# Patient Record
Sex: FEMALE | Race: OTHER
[De-identification: names, ages, dates, MRNs, and addresses within clinical notes are randomized per-mention and may not be internally consistent; named-entity substitution may affect disease eponyms.]

---

## 2023-08-16 ENCOUNTER — NON-APPOINTMENT (OUTPATIENT)
Age: 68
End: 2023-08-16

## 2024-01-19 ENCOUNTER — OFFICE (OUTPATIENT)
Facility: LOCATION | Age: 69
Setting detail: OPHTHALMOLOGY
End: 2024-01-19
Payer: MEDICARE

## 2024-01-19 ENCOUNTER — RX ONLY (RX ONLY)
Age: 69
End: 2024-01-19

## 2024-01-19 DIAGNOSIS — H01.004: ICD-10-CM

## 2024-01-19 DIAGNOSIS — H01.005: ICD-10-CM

## 2024-01-19 DIAGNOSIS — H01.002: ICD-10-CM

## 2024-01-19 DIAGNOSIS — H01.001: ICD-10-CM

## 2024-01-19 PROCEDURE — 92012 INTRM OPH EXAM EST PATIENT: CPT | Performed by: OPHTHALMOLOGY

## 2024-01-19 ASSESSMENT — LID EXAM ASSESSMENTS
OD_BLEPHARITIS: RLL RUL 3+
OS_BLEPHARITIS: LLL LUL 3+
OD_EDEMA: RUL 1+

## 2024-02-27 ENCOUNTER — OFFICE (OUTPATIENT)
Facility: LOCATION | Age: 69
Setting detail: OPHTHALMOLOGY
End: 2024-02-27
Payer: MEDICARE

## 2024-02-27 DIAGNOSIS — H01.004: ICD-10-CM

## 2024-02-27 DIAGNOSIS — H52.4: ICD-10-CM

## 2024-02-27 DIAGNOSIS — H25.13: ICD-10-CM

## 2024-02-27 DIAGNOSIS — H43.813: ICD-10-CM

## 2024-02-27 DIAGNOSIS — H16.223: ICD-10-CM

## 2024-02-27 DIAGNOSIS — H01.005: ICD-10-CM

## 2024-02-27 DIAGNOSIS — H01.002: ICD-10-CM

## 2024-02-27 DIAGNOSIS — H01.001: ICD-10-CM

## 2024-02-27 PROCEDURE — 92014 COMPRE OPH EXAM EST PT 1/>: CPT | Performed by: OPHTHALMOLOGY

## 2024-02-27 PROCEDURE — 92015 DETERMINE REFRACTIVE STATE: CPT | Performed by: OPHTHALMOLOGY

## 2024-02-27 PROCEDURE — 92250 FUNDUS PHOTOGRAPHY W/I&R: CPT | Performed by: OPHTHALMOLOGY

## 2024-02-27 PROCEDURE — 92202 OPSCPY EXTND ON/MAC DRAW: CPT | Performed by: OPHTHALMOLOGY

## 2024-02-27 ASSESSMENT — SUPERFICIAL PUNCTATE KERATITIS (SPK)
OS_SPK: 3+
OD_SPK: 3+

## 2024-02-27 ASSESSMENT — REFRACTION_AUTOREFRACTION
OD_SPHERE: +1.75
OS_SPHERE: +3.00
OS_AXIS: 075
OS_CYLINDER: -0.25
OD_CYLINDER: -0.25
OD_AXIS: 040

## 2024-02-27 ASSESSMENT — REFRACTION_CURRENTRX
OD_AXIS: 060
OS_SPHERE: +4.50
OS_AXIS: 090
OS_CYLINDER: -1.50
OS_OVR_VA: 20/
OS_SPHERE: +3.25
OS_OVR_VA: 20/
OD_OVR_VA: 20/
OD_CYLINDER: -0.50
OD_CYLINDER: -0.50
OS_AXIS: 090
OD_SPHERE: +1.25
OD_OVR_VA: 20/
OD_SPHERE: +2.50
OD_AXIS: 055
OS_CYLINDER: -0.50

## 2024-02-27 ASSESSMENT — SPHEQUIV_DERIVED
OD_SPHEQUIV: 1
OS_SPHEQUIV: 2.5
OD_SPHEQUIV: 1.625
OS_SPHEQUIV: 2.875

## 2024-02-27 ASSESSMENT — REFRACTION_MANIFEST
OD_AXIS: 060
OS_SPHERE: +3.25
OD_SPHERE: +1.25
OS_AXIS: 090
OD_ADD: +2.25
OS_CYLINDER: -1.50
OD_CYLINDER: -0.50
OS_ADD: +2.25

## 2024-02-27 ASSESSMENT — CONFRONTATIONAL VISUAL FIELD TEST (CVF)
OD_FINDINGS: FULL
OS_FINDINGS: FULL

## 2024-02-27 ASSESSMENT — LID EXAM ASSESSMENTS
OS_BLEPHARITIS: LLL LUL 3+
OD_EDEMA: RUL 1+
OD_BLEPHARITIS: RLL RUL 3+

## 2024-07-16 PROBLEM — Z00.00 ENCOUNTER FOR PREVENTIVE HEALTH EXAMINATION: Status: ACTIVE | Noted: 2024-07-16

## 2024-07-18 ENCOUNTER — APPOINTMENT (OUTPATIENT)
Dept: ORTHOPEDIC SURGERY | Facility: CLINIC | Age: 69
End: 2024-07-18
Payer: MEDICARE

## 2024-07-18 VITALS — HEIGHT: 65 IN | WEIGHT: 130 LBS | BODY MASS INDEX: 21.66 KG/M2

## 2024-07-18 DIAGNOSIS — M17.11 UNILATERAL PRIMARY OSTEOARTHRITIS, RIGHT KNEE: ICD-10-CM

## 2024-07-18 PROCEDURE — 99203 OFFICE O/P NEW LOW 30 MIN: CPT

## 2024-07-19 DIAGNOSIS — M25.561 PAIN IN RIGHT KNEE: ICD-10-CM

## 2024-07-19 RX ORDER — MELOXICAM 15 MG/1
15 TABLET ORAL
Qty: 14 | Refills: 1 | Status: ACTIVE | COMMUNITY
Start: 2024-07-19 | End: 1900-01-01

## 2024-07-22 PROBLEM — M17.11 PRIMARY OSTEOARTHRITIS OF RIGHT KNEE: Status: ACTIVE | Noted: 2024-07-22

## 2024-09-16 ENCOUNTER — APPOINTMENT (OUTPATIENT)
Dept: ORTHOPEDIC SURGERY | Facility: CLINIC | Age: 69
End: 2024-09-16
Payer: MEDICARE

## 2024-09-16 VITALS
SYSTOLIC BLOOD PRESSURE: 135 MMHG | HEIGHT: 65 IN | HEART RATE: 76 BPM | WEIGHT: 130 LBS | DIASTOLIC BLOOD PRESSURE: 75 MMHG | BODY MASS INDEX: 21.66 KG/M2

## 2024-09-16 PROCEDURE — 99214 OFFICE O/P EST MOD 30 MIN: CPT | Mod: 25

## 2024-09-16 PROCEDURE — 20611 DRAIN/INJ JOINT/BURSA W/US: CPT | Mod: RT

## 2024-09-16 RX ORDER — HYALURONATE SODIUM 20 MG/2 ML
20 SYRINGE (ML) INTRAARTICULAR
Refills: 0 | Status: COMPLETED | OUTPATIENT
Start: 2024-09-16

## 2024-09-16 RX ADMIN — Medication 0 MG/2ML: at 00:00

## 2024-09-17 NOTE — REASON FOR VISIT
[Follow-Up Visit] : a follow-up visit for [Knee Pain] : knee pain [FreeTextEntry2] : Patient presents for follow up of primary osteoarthritis of the right knee.

## 2024-09-17 NOTE — DISCUSSION/SUMMARY
[de-identified] : The patient presents today for follow-up regarding right knee osteoarthritis.  She is noting continued pain despite meloxicam and modification of activities.  She continues to have to ambulate with a cane secondary to the pain.  I discussed further treatment options.  Patient understands that she needs a knee replacement to improve her function however she is not ready to proceed as she has some family functions that she needs to attend over the next few months.  In lieu of that we recommended a course of Euflexxa to the patient.  On today's visit we injected the first dose of Euflexxa under sterile conditions without difficulty.  Instructions are given postinjection for ice analgesics and modification of activities.  I will see the patient back in the office in 1 week for the next injection.  At least 30 minutes was spent performing the evaluation and management on today's office visit.  This includes but is not limited to preparing to see patient including review of any test results or outside medical records, obtaining and/or reviewing separately obtained history, performing examination and evaluation, counseling and educating the patient on their diagnosis and treatment recommendations, ordering medications, tests, or procedures, documenting clinical information in the electronic health record, independently interpreting results (not separately reported) and communicating results to the patient, and coordination of care.

## 2024-09-17 NOTE — PHYSICAL EXAM
[de-identified] : The patient appears well nourished  and in no apparent distress.  The patient is alert and oriented to person, place, and time.   Affect and mood appear normal.    The head is normocephalic and atraumatic.  The eyes reveal normal sclera and extra ocular muscles are intact.   The neck appears normal with no jugular venous distention or masses noted.   Skin shows normal turgor with no evidence of eczema or psoriasis.  No respiratory distress noted.  The patient ambulates with an antalgic gait.  The right knee has improvement of range of motion from 0 to 120 degrees.  Pain noted with terminal flexion.  Tenderness noted over the medial joint.  Mild effusion noted.  No ecchymosis noted.  There is a negative Lachman sign and negative anterior/posterior drawer.  Negative pivot shift test.  There is no instability to varus/valgus stress.    Strength could not be tested secondary to discomfort and loss of motion.  Sensation intact distally.  There are normal pulses distally and good capillary refill.  No edema or lymphadenopathy noted.

## 2024-09-17 NOTE — DISCUSSION/SUMMARY
[de-identified] : The patient presents today for follow-up regarding right knee osteoarthritis.  She is noting continued pain despite meloxicam and modification of activities.  She continues to have to ambulate with a cane secondary to the pain.  I discussed further treatment options.  Patient understands that she needs a knee replacement to improve her function however she is not ready to proceed as she has some family functions that she needs to attend over the next few months.  In lieu of that we recommended a course of Euflexxa to the patient.  On today's visit we injected the first dose of Euflexxa under sterile conditions without difficulty.  Instructions are given postinjection for ice analgesics and modification of activities.  I will see the patient back in the office in 1 week for the next injection.  At least 30 minutes was spent performing the evaluation and management on today's office visit.  This includes but is not limited to preparing to see patient including review of any test results or outside medical records, obtaining and/or reviewing separately obtained history, performing examination and evaluation, counseling and educating the patient on their diagnosis and treatment recommendations, ordering medications, tests, or procedures, documenting clinical information in the electronic health record, independently interpreting results (not separately reported) and communicating results to the patient, and coordination of care.

## 2024-09-17 NOTE — PHYSICAL EXAM
[de-identified] : The patient appears well nourished  and in no apparent distress.  The patient is alert and oriented to person, place, and time.   Affect and mood appear normal.    The head is normocephalic and atraumatic.  The eyes reveal normal sclera and extra ocular muscles are intact.   The neck appears normal with no jugular venous distention or masses noted.   Skin shows normal turgor with no evidence of eczema or psoriasis.  No respiratory distress noted.  The patient ambulates with an antalgic gait.  The right knee has improvement of range of motion from 0 to 120 degrees.  Pain noted with terminal flexion.  Tenderness noted over the medial joint.  Mild effusion noted.  No ecchymosis noted.  There is a negative Lachman sign and negative anterior/posterior drawer.  Negative pivot shift test.  There is no instability to varus/valgus stress.    Strength could not be tested secondary to discomfort and loss of motion.  Sensation intact distally.  There are normal pulses distally and good capillary refill.  No edema or lymphadenopathy noted.

## 2024-09-17 NOTE — HISTORY OF PRESENT ILLNESS
[de-identified] : This patient presents today for follow-up regarding osteoarthritis about the right knee.  She states her symptoms have somewhat improved but still has a lot of pain and has to ambulate with a cane which she is not happy about.  Pain level 3 out of 10.  Pain worse with activity such as ambulation and stair climbing.  Pain is improved with rest.  Denies radicular symptoms or numbness and tingling.  She did take the meloxicam which was prescribed with some improvement but still has significant discomfort with activities.  Patient takes Advil intermittently for the pain.  She presents today for follow-up and reevaluation regarding continued pain secondary to osteoarthritis.

## 2024-09-17 NOTE — PROCEDURE
[de-identified] : Using sterile technique, 2cc of depomedrol 40mg/ml and 3cc of 1% plain lidocaine was drawn up into a sterile 5cc syringe.  The right knee was then sterilely prepped with chlorhexidine and ethylene chloride spray was used as an anesthetic prior to injection.  Under ultrasound guidance utilizing the Timmons Lumify ultrasound probe the depomedrol/lidocaine mixture was injected into the knee joint just above and lateral to the patella into the suprapatellar pouch.  The injection was confirmed using ultrasound and a spot image was saved of the injection.  The patient tolerated the procedure well without difficulty.  The patient was given instructions on the use of ice and anti-inflammatories post injection site soreness. English

## 2024-09-17 NOTE — PROCEDURE
[de-identified] : Using sterile technique, 2cc of depomedrol 40mg/ml and 3cc of 1% plain lidocaine was drawn up into a sterile 5cc syringe.  The right knee was then sterilely prepped with chlorhexidine and ethylene chloride spray was used as an anesthetic prior to injection.  Under ultrasound guidance utilizing the Timmons Lumify ultrasound probe the depomedrol/lidocaine mixture was injected into the knee joint just above and lateral to the patella into the suprapatellar pouch.  The injection was confirmed using ultrasound and a spot image was saved of the injection.  The patient tolerated the procedure well without difficulty.  The patient was given instructions on the use of ice and anti-inflammatories post injection site soreness.

## 2024-09-17 NOTE — HISTORY OF PRESENT ILLNESS
[de-identified] : This patient presents today for follow-up regarding osteoarthritis about the right knee.  She states her symptoms have somewhat improved but still has a lot of pain and has to ambulate with a cane which she is not happy about.  Pain level 3 out of 10.  Pain worse with activity such as ambulation and stair climbing.  Pain is improved with rest.  Denies radicular symptoms or numbness and tingling.  She did take the meloxicam which was prescribed with some improvement but still has significant discomfort with activities.  Patient takes Advil intermittently for the pain.  She presents today for follow-up and reevaluation regarding continued pain secondary to osteoarthritis.

## 2024-09-24 ENCOUNTER — APPOINTMENT (OUTPATIENT)
Dept: ORTHOPEDIC SURGERY | Facility: CLINIC | Age: 69
End: 2024-09-24
Payer: MEDICARE

## 2024-09-24 DIAGNOSIS — M17.11 UNILATERAL PRIMARY OSTEOARTHRITIS, RIGHT KNEE: ICD-10-CM

## 2024-09-24 PROCEDURE — 20611 DRAIN/INJ JOINT/BURSA W/US: CPT | Mod: RT

## 2024-09-24 RX ORDER — HYALURONATE SODIUM 20 MG/2 ML
20 SYRINGE (ML) INTRAARTICULAR
Refills: 0 | Status: COMPLETED | OUTPATIENT
Start: 2024-09-24

## 2024-09-24 RX ADMIN — Medication 2 MG/2ML: at 00:00

## 2024-09-24 NOTE — PHYSICAL EXAM
[de-identified] : The patient appears well nourished  and in no apparent distress.  The patient is alert and oriented to person, place, and time.   Affect and mood appear normal.    The head is normocephalic and atraumatic.  The eyes reveal normal sclera and extra ocular muscles are intact.   The neck appears normal with no jugular venous distention or masses noted.   Skin shows normal turgor with no evidence of eczema or psoriasis.  No respiratory distress noted.  The patient ambulates with an antalgic gait.  The right knee has improvement of range of motion from 0 to 120 degrees.  Pain noted with terminal flexion.  Tenderness noted over the medial joint.  Mild effusion noted.  No ecchymosis noted.  There is a negative Lachman sign and negative anterior/posterior drawer.  Negative pivot shift test.  There is no instability to varus/valgus stress.    Strength could not be tested secondary to discomfort and loss of motion.  Sensation intact distally.  There are normal pulses distally and good capillary refill.  No edema or lymphadenopathy noted.

## 2024-09-24 NOTE — DISCUSSION/SUMMARY
[de-identified] : On todays visit we injected the  dose of hyaluronic acid without difficulty.  Instructions were given postinjection regarding ice, analgesics, and modification of activities.  I will see the patient back in one week for the next injection.

## 2024-09-24 NOTE — PROCEDURE
[de-identified] : Using sterile technique, 2cc of depomedrol 40mg/ml and 3cc of 1% plain lidocaine was drawn up into a sterile 5cc syringe.  The right knee was then sterilely prepped with chlorhexidine and ethylene chloride spray was used as an anesthetic prior to injection.  Under ultrasound guidance utilizing the Timmons Lumify ultrasound probe the depomedrol/lidocaine mixture was injected into the knee joint just above and lateral to the patella into the suprapatellar pouch.  The injection was confirmed using ultrasound and a spot image was saved of the injection.  The patient tolerated the procedure well without difficulty.  The patient was given instructions on the use of ice and anti-inflammatories post injection site soreness.

## 2024-09-24 NOTE — HISTORY OF PRESENT ILLNESS
[de-identified] : This patient presents today for the second Euflexxa injection to the right knee.  She had the first injection last week without difficulty.  She is noting some slight improvement.  Pain level currently 4 out of 10.  No adverse reactions noted after the last injection.  Patient takes Advil for the pain.

## 2024-10-09 ENCOUNTER — APPOINTMENT (OUTPATIENT)
Dept: ORTHOPEDIC SURGERY | Facility: CLINIC | Age: 69
End: 2024-10-09
Payer: MEDICARE

## 2024-10-09 DIAGNOSIS — M17.11 UNILATERAL PRIMARY OSTEOARTHRITIS, RIGHT KNEE: ICD-10-CM

## 2024-10-09 PROCEDURE — 20611 DRAIN/INJ JOINT/BURSA W/US: CPT | Mod: RT

## 2024-10-09 RX ORDER — HYALURONATE SODIUM 20 MG/2 ML
20 SYRINGE (ML) INTRAARTICULAR
Refills: 0 | Status: COMPLETED | OUTPATIENT
Start: 2024-10-09

## 2024-10-09 RX ADMIN — Medication 2 MG/2ML: at 00:00

## 2025-03-18 ENCOUNTER — APPOINTMENT (OUTPATIENT)
Dept: ORTHOPEDIC SURGERY | Facility: CLINIC | Age: 70
End: 2025-03-18
Payer: MEDICARE

## 2025-03-18 VITALS — WEIGHT: 132 LBS | BODY MASS INDEX: 21.99 KG/M2 | HEIGHT: 65 IN

## 2025-03-18 DIAGNOSIS — M17.11 UNILATERAL PRIMARY OSTEOARTHRITIS, RIGHT KNEE: ICD-10-CM

## 2025-03-18 PROCEDURE — 73562 X-RAY EXAM OF KNEE 3: CPT | Mod: RT

## 2025-03-18 PROCEDURE — 99214 OFFICE O/P EST MOD 30 MIN: CPT

## 2025-03-31 ENCOUNTER — NON-APPOINTMENT (OUTPATIENT)
Age: 70
End: 2025-03-31

## 2025-04-15 ENCOUNTER — OUTPATIENT (OUTPATIENT)
Dept: OUTPATIENT SERVICES | Facility: HOSPITAL | Age: 70
LOS: 1 days | End: 2025-04-15
Payer: MEDICARE

## 2025-04-15 VITALS
HEIGHT: 64 IN | WEIGHT: 138.89 LBS | RESPIRATION RATE: 15 BRPM | TEMPERATURE: 98 F | DIASTOLIC BLOOD PRESSURE: 65 MMHG | OXYGEN SATURATION: 99 % | HEART RATE: 72 BPM | SYSTOLIC BLOOD PRESSURE: 103 MMHG

## 2025-04-15 DIAGNOSIS — M17.11 UNILATERAL PRIMARY OSTEOARTHRITIS, RIGHT KNEE: ICD-10-CM

## 2025-04-15 DIAGNOSIS — Z01.818 ENCOUNTER FOR OTHER PREPROCEDURAL EXAMINATION: ICD-10-CM

## 2025-04-15 DIAGNOSIS — N83.9 NONINFLAMMATORY DISORDER OF OVARY, FALLOPIAN TUBE AND BROAD LIGAMENT, UNSPECIFIED: Chronic | ICD-10-CM

## 2025-04-15 LAB
A1C WITH ESTIMATED AVERAGE GLUCOSE RESULT: 5.2 % — SIGNIFICANT CHANGE UP (ref 4–5.6)
ALBUMIN SERPL ELPH-MCNC: 3.8 G/DL — SIGNIFICANT CHANGE UP (ref 3.3–5)
ALP SERPL-CCNC: 90 U/L — SIGNIFICANT CHANGE UP (ref 30–120)
ALT FLD-CCNC: 44 U/L — SIGNIFICANT CHANGE UP (ref 10–60)
ANION GAP SERPL CALC-SCNC: 6 MMOL/L — SIGNIFICANT CHANGE UP (ref 5–17)
APTT BLD: 30.9 SEC — SIGNIFICANT CHANGE UP (ref 24.5–35.6)
AST SERPL-CCNC: 26 U/L — SIGNIFICANT CHANGE UP (ref 10–40)
BILIRUB SERPL-MCNC: 0.4 MG/DL — SIGNIFICANT CHANGE UP (ref 0.2–1.2)
BLD GP AB SCN SERPL QL: SIGNIFICANT CHANGE UP
BUN SERPL-MCNC: 16 MG/DL — SIGNIFICANT CHANGE UP (ref 7–23)
CALCIUM SERPL-MCNC: 9.1 MG/DL — SIGNIFICANT CHANGE UP (ref 8.4–10.5)
CHLORIDE SERPL-SCNC: 103 MMOL/L — SIGNIFICANT CHANGE UP (ref 96–108)
CO2 SERPL-SCNC: 30 MMOL/L — SIGNIFICANT CHANGE UP (ref 22–31)
CREAT SERPL-MCNC: 0.68 MG/DL — SIGNIFICANT CHANGE UP (ref 0.5–1.3)
EGFR: 94 ML/MIN/1.73M2 — SIGNIFICANT CHANGE UP
EGFR: 94 ML/MIN/1.73M2 — SIGNIFICANT CHANGE UP
ESTIMATED AVERAGE GLUCOSE: 103 MG/DL — SIGNIFICANT CHANGE UP (ref 68–114)
GLUCOSE SERPL-MCNC: 86 MG/DL — SIGNIFICANT CHANGE UP (ref 70–99)
HCT VFR BLD CALC: 38.3 % — SIGNIFICANT CHANGE UP (ref 34.5–45)
HGB BLD-MCNC: 12.8 G/DL — SIGNIFICANT CHANGE UP (ref 11.5–15.5)
INR BLD: 0.96 RATIO — SIGNIFICANT CHANGE UP (ref 0.85–1.16)
MCHC RBC-ENTMCNC: 29.9 PG — SIGNIFICANT CHANGE UP (ref 27–34)
MCHC RBC-ENTMCNC: 33.4 G/DL — SIGNIFICANT CHANGE UP (ref 32–36)
MCV RBC AUTO: 89.5 FL — SIGNIFICANT CHANGE UP (ref 80–100)
NRBC BLD AUTO-RTO: 0 /100 WBCS — SIGNIFICANT CHANGE UP (ref 0–0)
PLATELET # BLD AUTO: 222 K/UL — SIGNIFICANT CHANGE UP (ref 150–400)
POTASSIUM SERPL-MCNC: 3.9 MMOL/L — SIGNIFICANT CHANGE UP (ref 3.5–5.3)
POTASSIUM SERPL-SCNC: 3.9 MMOL/L — SIGNIFICANT CHANGE UP (ref 3.5–5.3)
PROT SERPL-MCNC: 7.2 G/DL — SIGNIFICANT CHANGE UP (ref 6–8.3)
PROTHROM AB SERPL-ACNC: 11.3 SEC — SIGNIFICANT CHANGE UP (ref 9.9–13.4)
RBC # BLD: 4.28 M/UL — SIGNIFICANT CHANGE UP (ref 3.8–5.2)
RBC # FLD: 12.1 % — SIGNIFICANT CHANGE UP (ref 10.3–14.5)
SODIUM SERPL-SCNC: 139 MMOL/L — SIGNIFICANT CHANGE UP (ref 135–145)
WBC # BLD: 3.98 K/UL — SIGNIFICANT CHANGE UP (ref 3.8–10.5)
WBC # FLD AUTO: 3.98 K/UL — SIGNIFICANT CHANGE UP (ref 3.8–10.5)

## 2025-04-15 PROCEDURE — 87640 STAPH A DNA AMP PROBE: CPT

## 2025-04-15 PROCEDURE — 87641 MR-STAPH DNA AMP PROBE: CPT

## 2025-04-15 PROCEDURE — 80053 COMPREHEN METABOLIC PANEL: CPT

## 2025-04-15 PROCEDURE — 86901 BLOOD TYPING SEROLOGIC RH(D): CPT

## 2025-04-15 PROCEDURE — 86850 RBC ANTIBODY SCREEN: CPT

## 2025-04-15 PROCEDURE — 36415 COLL VENOUS BLD VENIPUNCTURE: CPT

## 2025-04-15 PROCEDURE — 93010 ELECTROCARDIOGRAM REPORT: CPT

## 2025-04-15 PROCEDURE — 85610 PROTHROMBIN TIME: CPT

## 2025-04-15 PROCEDURE — 86900 BLOOD TYPING SEROLOGIC ABO: CPT

## 2025-04-15 PROCEDURE — G0463: CPT

## 2025-04-15 PROCEDURE — 85027 COMPLETE CBC AUTOMATED: CPT

## 2025-04-15 PROCEDURE — 83036 HEMOGLOBIN GLYCOSYLATED A1C: CPT

## 2025-04-15 PROCEDURE — 93005 ELECTROCARDIOGRAM TRACING: CPT

## 2025-04-15 PROCEDURE — 85730 THROMBOPLASTIN TIME PARTIAL: CPT

## 2025-04-15 NOTE — H&P PST ADULT - HISTORY OF PRESENT ILLNESS
68 yo female reports 1 year history of right knee pain for which she has tried HA injections.  Pain exacerbated with walking rating 8/10 at worst.  She is scheduled for right total knee replacement on 4/30/2025

## 2025-04-15 NOTE — H&P PST ADULT - NSICDXPASTMEDICALHX_GEN_ALL_CORE_FT
Right arm; PAST MEDICAL HISTORY:  At risk for sleep apnea     History of DVT of lower extremity     History of hepatitis A     Hypothyroidism     Osteoarthritis of right knee     Personal history of TIA (transient ischemic attack)     Seasonal allergies     Snoring     Vapes nicotine containing substance

## 2025-04-15 NOTE — H&P PST ADULT - NSICDXFAMILYHX_GEN_ALL_CORE_FT
FAMILY HISTORY:  Sibling  Still living? No  Family history of brain cancer, Age at diagnosis: Age Unknown  Family history of breast cancer, Age at diagnosis: Age Unknown

## 2025-04-16 ENCOUNTER — NON-APPOINTMENT (OUTPATIENT)
Age: 70
End: 2025-04-16

## 2025-04-16 LAB
MRSA PCR RESULT.: SIGNIFICANT CHANGE UP
S AUREUS DNA NOSE QL NAA+PROBE: DETECTED

## 2025-04-16 RX ORDER — MUPIROCIN CALCIUM 20 MG/G
1 CREAM TOPICAL
Qty: 1 | Refills: 0
Start: 2025-04-16 | End: 2025-04-20

## 2025-04-16 NOTE — PROGRESS NOTE ADULT - SUBJECTIVE AND OBJECTIVE BOX
nose culture ; MSSA positive   Escribed Mupirocin nasal ointment BID for 5 days , spoke with patient . verbalized understanding

## 2025-04-25 PROBLEM — R06.83 SNORING: Chronic | Status: ACTIVE | Noted: 2025-04-15

## 2025-04-25 PROBLEM — Z86.718 PERSONAL HISTORY OF OTHER VENOUS THROMBOSIS AND EMBOLISM: Chronic | Status: ACTIVE | Noted: 2025-04-15

## 2025-04-25 PROBLEM — E03.9 HYPOTHYROIDISM, UNSPECIFIED: Chronic | Status: ACTIVE | Noted: 2025-04-15

## 2025-04-25 PROBLEM — Z86.19 PERSONAL HISTORY OF OTHER INFECTIOUS AND PARASITIC DISEASES: Chronic | Status: ACTIVE | Noted: 2025-04-15

## 2025-04-25 PROBLEM — Z86.73 PERSONAL HISTORY OF TRANSIENT ISCHEMIC ATTACK (TIA), AND CEREBRAL INFARCTION WITHOUT RESIDUAL DEFICITS: Chronic | Status: ACTIVE | Noted: 2025-04-15

## 2025-04-25 PROBLEM — J30.2 OTHER SEASONAL ALLERGIC RHINITIS: Chronic | Status: ACTIVE | Noted: 2025-04-15

## 2025-04-25 PROBLEM — Z72.0 TOBACCO USE: Chronic | Status: ACTIVE | Noted: 2025-04-15

## 2025-04-25 PROBLEM — M17.11 UNILATERAL PRIMARY OSTEOARTHRITIS, RIGHT KNEE: Chronic | Status: ACTIVE | Noted: 2025-04-15

## 2025-04-25 PROBLEM — Z91.89 OTHER SPECIFIED PERSONAL RISK FACTORS, NOT ELSEWHERE CLASSIFIED: Chronic | Status: ACTIVE | Noted: 2025-04-15

## 2025-04-26 ENCOUNTER — TRANSCRIPTION ENCOUNTER (OUTPATIENT)
Age: 70
End: 2025-04-26

## 2025-04-30 ENCOUNTER — TRANSCRIPTION ENCOUNTER (OUTPATIENT)
Age: 70
End: 2025-04-30

## 2025-04-30 ENCOUNTER — INPATIENT (INPATIENT)
Facility: HOSPITAL | Age: 70
LOS: 0 days | Discharge: HOME | DRG: 470 | End: 2025-05-01
Attending: ORTHOPAEDIC SURGERY | Admitting: ORTHOPAEDIC SURGERY
Payer: MEDICARE

## 2025-04-30 ENCOUNTER — APPOINTMENT (OUTPATIENT)
Dept: ORTHOPEDIC SURGERY | Facility: HOSPITAL | Age: 70
End: 2025-04-30

## 2025-04-30 VITALS
WEIGHT: 145.06 LBS | HEART RATE: 78 BPM | OXYGEN SATURATION: 99 % | HEIGHT: 65 IN | RESPIRATION RATE: 15 BRPM | DIASTOLIC BLOOD PRESSURE: 86 MMHG | TEMPERATURE: 98 F | SYSTOLIC BLOOD PRESSURE: 133 MMHG

## 2025-04-30 DIAGNOSIS — N83.9 NONINFLAMMATORY DISORDER OF OVARY, FALLOPIAN TUBE AND BROAD LIGAMENT, UNSPECIFIED: Chronic | ICD-10-CM

## 2025-04-30 DIAGNOSIS — M17.11 UNILATERAL PRIMARY OSTEOARTHRITIS, RIGHT KNEE: ICD-10-CM

## 2025-04-30 LAB — ABO RH CONFIRMATION: SIGNIFICANT CHANGE UP

## 2025-04-30 PROCEDURE — 99222 1ST HOSP IP/OBS MODERATE 55: CPT

## 2025-04-30 PROCEDURE — 73560 X-RAY EXAM OF KNEE 1 OR 2: CPT | Mod: 26,RT

## 2025-04-30 PROCEDURE — 27447 TOTAL KNEE ARTHROPLASTY: CPT | Mod: RT

## 2025-04-30 DEVICE — BONE WAX 2.5GM: Type: IMPLANTABLE DEVICE | Site: RIGHT | Status: FUNCTIONAL

## 2025-04-30 DEVICE — IMPLANTABLE DEVICE: Type: IMPLANTABLE DEVICE | Site: RIGHT | Status: FUNCTIONAL

## 2025-04-30 DEVICE — INSERT TIB NONPOROUS UNIV SZ 5 RT: Type: IMPLANTABLE DEVICE | Site: RIGHT | Status: FUNCTIONAL

## 2025-04-30 DEVICE — COMP FEM NON POROUS SZ 6 RT: Type: IMPLANTABLE DEVICE | Site: RIGHT | Status: FUNCTIONAL

## 2025-04-30 DEVICE — COMP PATELLA TRI-PEG E-PLUS POLY 9X35MM: Type: IMPLANTABLE DEVICE | Site: RIGHT | Status: FUNCTIONAL

## 2025-04-30 DEVICE — INSERT TIB EMPOWR SZ 5 BRIDGE UP 12MM: Type: IMPLANTABLE DEVICE | Site: RIGHT | Status: FUNCTIONAL

## 2025-04-30 RX ORDER — SODIUM CHLORIDE 9 G/1000ML
1000 INJECTION, SOLUTION INTRAVENOUS
Refills: 0 | Status: DISCONTINUED | OUTPATIENT
Start: 2025-04-30 | End: 2025-04-30

## 2025-04-30 RX ORDER — OMEPRAZOLE 20 MG/1
1 CAPSULE, DELAYED RELEASE ORAL
Qty: 30 | Refills: 0
Start: 2025-04-30 | End: 2025-05-29

## 2025-04-30 RX ORDER — POLYETHYLENE GLYCOL 3350 17 G/17G
17 POWDER, FOR SOLUTION ORAL AT BEDTIME
Refills: 0 | Status: DISCONTINUED | OUTPATIENT
Start: 2025-04-30 | End: 2025-05-01

## 2025-04-30 RX ORDER — SENNA 187 MG
2 TABLET ORAL AT BEDTIME
Refills: 0 | Status: DISCONTINUED | OUTPATIENT
Start: 2025-04-30 | End: 2025-05-01

## 2025-04-30 RX ORDER — LEVOTHYROXINE SODIUM 300 MCG
137 TABLET ORAL
Refills: 0 | Status: DISCONTINUED | OUTPATIENT
Start: 2025-05-02 | End: 2025-05-01

## 2025-04-30 RX ORDER — MELOXICAM 15 MG/1
15 TABLET ORAL DAILY
Refills: 0 | Status: DISCONTINUED | OUTPATIENT
Start: 2025-04-30 | End: 2025-05-01

## 2025-04-30 RX ORDER — OXYCODONE HYDROCHLORIDE 30 MG/1
10 TABLET ORAL
Refills: 0 | Status: DISCONTINUED | OUTPATIENT
Start: 2025-04-30 | End: 2025-05-01

## 2025-04-30 RX ORDER — LEVOTHYROXINE SODIUM 300 MCG
1 TABLET ORAL
Qty: 0 | Refills: 0 | DISCHARGE

## 2025-04-30 RX ORDER — LEVOTHYROXINE SODIUM 300 MCG
1 TABLET ORAL
Refills: 0 | DISCHARGE

## 2025-04-30 RX ORDER — CEFADROXIL 500 MG/1
1 CAPSULE ORAL
Qty: 14 | Refills: 0
Start: 2025-04-30 | End: 2025-05-06

## 2025-04-30 RX ORDER — DEXAMETHASONE 0.5 MG/1
8 TABLET ORAL ONCE
Refills: 0 | Status: COMPLETED | OUTPATIENT
Start: 2025-05-01 | End: 2025-05-01

## 2025-04-30 RX ORDER — MELOXICAM 15 MG/1
1 TABLET ORAL
Qty: 30 | Refills: 0
Start: 2025-04-30 | End: 2025-05-29

## 2025-04-30 RX ORDER — MAGNESIUM, ALUMINUM HYDROXIDE 200-200 MG
30 TABLET,CHEWABLE ORAL
Refills: 0 | Status: DISCONTINUED | OUTPATIENT
Start: 2025-04-30 | End: 2025-05-01

## 2025-04-30 RX ORDER — CEFADROXIL 500 MG/1
500 CAPSULE ORAL
Refills: 0 | Status: DISCONTINUED | OUTPATIENT
Start: 2025-05-01 | End: 2025-05-01

## 2025-04-30 RX ORDER — TRANEXAMIC ACID 1000 MG/10
1000 AMPUL (ML) INTRAVENOUS ONCE
Refills: 0 | Status: COMPLETED | OUTPATIENT
Start: 2025-04-30 | End: 2025-04-30

## 2025-04-30 RX ORDER — CEFAZOLIN SODIUM IN 0.9 % NACL 3 G/100 ML
2000 INTRAVENOUS SOLUTION, PIGGYBACK (ML) INTRAVENOUS EVERY 8 HOURS
Refills: 0 | Status: COMPLETED | OUTPATIENT
Start: 2025-04-30 | End: 2025-05-01

## 2025-04-30 RX ORDER — OXYCODONE HYDROCHLORIDE 30 MG/1
5 TABLET ORAL
Refills: 0 | Status: DISCONTINUED | OUTPATIENT
Start: 2025-04-30 | End: 2025-05-01

## 2025-04-30 RX ORDER — ACETAMINOPHEN 500 MG/5ML
1000 LIQUID (ML) ORAL ONCE
Refills: 0 | Status: COMPLETED | OUTPATIENT
Start: 2025-04-30 | End: 2025-04-30

## 2025-04-30 RX ORDER — HYDROMORPHONE/SOD CHLOR,ISO/PF 2 MG/10 ML
0.5 SYRINGE (ML) INJECTION
Refills: 0 | Status: DISCONTINUED | OUTPATIENT
Start: 2025-04-30 | End: 2025-04-30

## 2025-04-30 RX ORDER — NICOTINE POLACRILEX 4 MG/1
2 GUM, CHEWING ORAL
Refills: 0 | Status: DISCONTINUED | OUTPATIENT
Start: 2025-04-30 | End: 2025-05-01

## 2025-04-30 RX ORDER — ACETAMINOPHEN 500 MG/5ML
1000 LIQUID (ML) ORAL ONCE
Refills: 0 | Status: COMPLETED | OUTPATIENT
Start: 2025-05-01 | End: 2025-05-01

## 2025-04-30 RX ORDER — ASPIRIN 325 MG
1 TABLET ORAL
Qty: 28 | Refills: 0
Start: 2025-04-30 | End: 2025-05-13

## 2025-04-30 RX ORDER — SEMAGLUTIDE 1 MG/.5ML
0.5 INJECTION, SOLUTION SUBCUTANEOUS
Refills: 0 | DISCHARGE

## 2025-04-30 RX ORDER — SODIUM CHLORIDE 9 G/1000ML
1000 INJECTION, SOLUTION INTRAVENOUS
Refills: 0 | Status: DISCONTINUED | OUTPATIENT
Start: 2025-04-30 | End: 2025-05-01

## 2025-04-30 RX ORDER — CEFAZOLIN SODIUM IN 0.9 % NACL 3 G/100 ML
2000 INTRAVENOUS SOLUTION, PIGGYBACK (ML) INTRAVENOUS ONCE
Refills: 0 | Status: COMPLETED | OUTPATIENT
Start: 2025-04-30 | End: 2025-04-30

## 2025-04-30 RX ORDER — APIXABAN 2.5 MG/1
1 TABLET, FILM COATED ORAL
Qty: 28 | Refills: 0
Start: 2025-04-30 | End: 2025-05-13

## 2025-04-30 RX ORDER — APIXABAN 2.5 MG/1
2.5 TABLET, FILM COATED ORAL EVERY 12 HOURS
Refills: 0 | Status: DISCONTINUED | OUTPATIENT
Start: 2025-05-01 | End: 2025-05-01

## 2025-04-30 RX ORDER — MAGNESIUM HYDROXIDE 400 MG/5ML
30 SUSPENSION ORAL DAILY
Refills: 0 | Status: DISCONTINUED | OUTPATIENT
Start: 2025-04-30 | End: 2025-05-01

## 2025-04-30 RX ORDER — ACETAMINOPHEN 500 MG/5ML
2 LIQUID (ML) ORAL
Qty: 0 | Refills: 0 | DISCHARGE
Start: 2025-04-30

## 2025-04-30 RX ORDER — SENNA 187 MG
2 TABLET ORAL
Qty: 0 | Refills: 0 | DISCHARGE
Start: 2025-04-30

## 2025-04-30 RX ORDER — HYDROMORPHONE/SOD CHLOR,ISO/PF 2 MG/10 ML
0.5 SYRINGE (ML) INJECTION
Refills: 0 | Status: DISCONTINUED | OUTPATIENT
Start: 2025-04-30 | End: 2025-05-01

## 2025-04-30 RX ORDER — ACETAMINOPHEN 500 MG/5ML
1000 LIQUID (ML) ORAL EVERY 8 HOURS
Refills: 0 | Status: DISCONTINUED | OUTPATIENT
Start: 2025-05-01 | End: 2025-05-01

## 2025-04-30 RX ORDER — LORATADINE 5 MG/5ML
1 SOLUTION ORAL
Refills: 0 | DISCHARGE

## 2025-04-30 RX ORDER — ONDANSETRON HCL/PF 4 MG/2 ML
4 VIAL (ML) INJECTION EVERY 6 HOURS
Refills: 0 | Status: DISCONTINUED | OUTPATIENT
Start: 2025-04-30 | End: 2025-05-01

## 2025-04-30 RX ORDER — POLYETHYLENE GLYCOL 3350 17 G/17G
17 POWDER, FOR SOLUTION ORAL
Qty: 0 | Refills: 0 | DISCHARGE
Start: 2025-04-30

## 2025-04-30 RX ORDER — ONDANSETRON HCL/PF 4 MG/2 ML
4 VIAL (ML) INJECTION ONCE
Refills: 0 | Status: DISCONTINUED | OUTPATIENT
Start: 2025-04-30 | End: 2025-04-30

## 2025-04-30 RX ORDER — LEVOTHYROXINE SODIUM 300 MCG
125 TABLET ORAL
Refills: 0 | Status: DISCONTINUED | OUTPATIENT
Start: 2025-05-01 | End: 2025-05-01

## 2025-04-30 RX ORDER — HYDROMORPHONE/SOD CHLOR,ISO/PF 2 MG/10 ML
1 SYRINGE (ML) INJECTION
Refills: 0 | Status: DISCONTINUED | OUTPATIENT
Start: 2025-04-30 | End: 2025-04-30

## 2025-04-30 RX ORDER — BISACODYL 5 MG
10 TABLET, DELAYED RELEASE (ENTERIC COATED) ORAL ONCE
Refills: 0 | Status: DISCONTINUED | OUTPATIENT
Start: 2025-05-02 | End: 2025-05-01

## 2025-04-30 RX ADMIN — OXYCODONE HYDROCHLORIDE 5 MILLIGRAM(S): 30 TABLET ORAL at 19:55

## 2025-04-30 RX ADMIN — Medication 500 MILLILITER(S): at 22:15

## 2025-04-30 RX ADMIN — OXYCODONE HYDROCHLORIDE 10 MILLIGRAM(S): 30 TABLET ORAL at 22:55

## 2025-04-30 RX ADMIN — Medication 500 MILLILITER(S): at 16:10

## 2025-04-30 RX ADMIN — Medication 1 APPLICATION(S): at 10:23

## 2025-04-30 RX ADMIN — SODIUM CHLORIDE 125 MILLILITER(S): 9 INJECTION, SOLUTION INTRAVENOUS at 18:48

## 2025-04-30 RX ADMIN — Medication 1000 MILLIGRAM(S): at 19:02

## 2025-04-30 RX ADMIN — Medication 400 MILLIGRAM(S): at 18:48

## 2025-04-30 RX ADMIN — Medication 100 MILLIGRAM(S): at 20:26

## 2025-04-30 RX ADMIN — OXYCODONE HYDROCHLORIDE 10 MILLIGRAM(S): 30 TABLET ORAL at 23:40

## 2025-04-30 RX ADMIN — OXYCODONE HYDROCHLORIDE 5 MILLIGRAM(S): 30 TABLET ORAL at 18:51

## 2025-04-30 RX ADMIN — SODIUM CHLORIDE 75 MILLILITER(S): 9 INJECTION, SOLUTION INTRAVENOUS at 14:47

## 2025-04-30 NOTE — CONSULT NOTE ADULT - ASSESSMENT
70 yo female, pmh of dvt, hypothyroidism, JALEEL/obesity now on ozempic, presenting for right knee replacement.     right knee replacement  - pain control - standing tylenol 1000 q8h,  - meloxicam 15 mg daily  oxycodone 5/10 prn for mod/severe pain, dilaudid iv for breakthrough pain  - bowel regimen   - eliquis 2.5 mg BID for DVT ppx  - pt/ot   - ivf    hypothyroidism - continue synthroid at home dosing (125/137 mcg each every other day alternating)    jaleel - can resume ozempic on dc    current smoker - can take nicotine lozenges prn while in hospital.

## 2025-04-30 NOTE — DISCHARGE NOTE PROVIDER - NSDCCPCAREPLAN_GEN_ALL_CORE_FT
PRINCIPAL DISCHARGE DIAGNOSIS  Diagnosis: Primary osteoarthritis of right knee  Assessment and Plan of Treatment: right TKA  Physical Therapy/Occupational Therapy for: ambulation, transfers, stairs, ADL's (activities of daily living), range of motion exercises, and isometrics  -Activity  • Weight Bearing as tolerated with rolling walker.  • Take short, frequent walks increasing the distance that you walk each day as tolerated.  • Change your position every hour to decrease pain and stiffness.  • Continue the exercises taught to you by your physical therapist.  • No driving until cleared by the doctor.  • No tub baths, hot tubs, or swimming pools until instructed by your doctor.  • Do not squat down on the floor.  • Do not kneel or twist your knee.  • Range of Motion Goals: Flexion= 120 degrees, Extension = 0 degrees  Keep incision clean and dry. You have a mepilex dressing.  It is water resistant and may get slightly wet in the shower, but don't soak it. Remove dressing in 7 days and leave wound open to air if it is dry.  Prineo was used for skin closure.  It is water resistant and may get slightly wet in the shower, but don't soak it as it may loosen.  Leave open to air.  Cold pack on continously for 72 hrs.  Change cold insert every 4 hrs.  then use as needed for pain/swelling  Opioid safety : Use lowest effective dose.  Do not keep opioid in the medicine cabinet in bathroom or on kitchen counter where others may have access to it.  No sharing w/ others. Do not drive while taking opioid.  To dispose of it some pharmacies do have drop boxes as do police stations.  Do not flush or put in trash.

## 2025-04-30 NOTE — DISCHARGE NOTE PROVIDER - PROVIDER TOKENS
PROVIDER:[TOKEN:[[id number]],SCHEDULEDAPPT:[05/16/2025],SCHEDULEDAPPTTIME:[11:00 AM],ESTABLISHEDPATIENT:[T]]

## 2025-04-30 NOTE — PROGRESS NOTE ADULT - SUBJECTIVE AND OBJECTIVE BOX
Post Op Day # 0    SUBJECTIVE    70yo Female status post right TKR .   Patient is alert and comfortable.    Pain is controlled with current pain regimen.  Denies nausea, vomiting, chest pain, shortness of breath, abdominal pain or fever.   No new complaints.    OBJECTIVE    Vital Signs Last 24 Hrs  T(C): 36.9 (30 Apr 2025 16:35), Max: 36.9 (30 Apr 2025 16:35)  T(F): 98.4 (30 Apr 2025 16:35), Max: 98.4 (30 Apr 2025 16:35)  HR: 84 (30 Apr 2025 16:35) (74 - 86)  BP: 116/63 (30 Apr 2025 16:35) (105/64 - 133/86)  BP(mean): --  RR: 20 (30 Apr 2025 16:35) (12 - 24)  SpO2: 100% (30 Apr 2025 16:35) (99% - 100%)    Parameters below as of 30 Apr 2025 16:35  Patient On (Oxygen Delivery Method): room air      I&O's Summary    30 Apr 2025 07:01  -  30 Apr 2025 16:58  --------------------------------------------------------  IN: 1965 mL / OUT: 50 mL / NET: 1915 mL        PHYSICAL EXAM    Right knee dressing is clean, dry and intact.   The calf is supple/nontender.   Sensation to light touch is grossly intact distally.   Motor function distally is intact.   No foot drop.   (2+) dorsalis pedis pulse. Capillary refill is less than 2 seconds. No cyanosis.      ASSESSMENT AND PLAN  - Orthopedically stable  - DVT prophylaxis: PAS +Eliquis 2.5 mg Q12h  - Continue physical therapy and occupational therapy  - Weight bearing as tolerated of the right lower extremity with assistance of a walker  - Incentive spirometry encouraged  - Pain control as clinically indicated  - Disposition:  Home tomorrow

## 2025-04-30 NOTE — PATIENT PROFILE ADULT - FALL HARM RISK - UNIVERSAL INTERVENTIONS
Bed in lowest position, wheels locked, appropriate side rails in place/Call bell, personal items and telephone in reach/Instruct patient to call for assistance before getting out of bed or chair/Non-slip footwear when patient is out of bed/[place] to call system/Physically safe environment - no spills, clutter or unnecessary equipment/Purposeful Proactive Rounding/Room/bathroom lighting operational, light cord in reach

## 2025-04-30 NOTE — CONSULT NOTE ADULT - SUBJECTIVE AND OBJECTIVE BOX
HPI:  70 yo female, pmh of dvt, hypothyroidism, JALEEL/obesity now on ozempic, presenting for right knee replacement. Seen on surgical floor, pain fair controlled, no current dizziness, nausea, vomiting, fever, chills    PAST MEDICAL & SURGICAL HISTORY:  History of DVT of lower extremity      Personal history of TIA (transient ischemic attack)      Osteoarthritis of right knee      History of hepatitis A      Hypothyroidism      Seasonal allergies      At risk for sleep apnea      Snoring      Vapes nicotine containing substance      History of appendectomy      Fallopian tube disorder          ANTIMICROBIAL:  ceFAZolin   IVPB 2000 milliGRAM(s) IV Intermittent every 8 hours    CARDIOVASCULAR:    PULMONARY:  cetirizine 10 milliGRAM(s) Oral daily PRN    NEUROLOGIC:  acetaminophen   IVPB .. 1000 milliGRAM(s) IV Intermittent once  HYDROmorphone  Injectable 0.5 milliGRAM(s) IV Push every 3 hours PRN  meloxicam 15 milliGRAM(s) Oral daily  ondansetron Injectable 4 milliGRAM(s) IV Push every 6 hours PRN  oxyCODONE    IR 5 milliGRAM(s) Oral every 3 hours PRN  oxyCODONE    IR 10 milliGRAM(s) Oral every 3 hours PRN    ONCOLOGIC:    HEMATOLOGIC:    GATROINTESTINAL:  aluminum hydroxide/magnesium hydroxide/simethicone Suspension 30 milliLiter(s) Oral four times a day PRN  magnesium hydroxide Suspension 30 milliLiter(s) Oral daily PRN  pantoprazole    Tablet 40 milliGRAM(s) Oral before breakfast  polyethylene glycol 3350 17 Gram(s) Oral at bedtime  senna 2 Tablet(s) Oral at bedtime     MEDS:    ENDO/METABOLIC:    IV FLUID/NUTRITION:  lactated ringers. 1000 milliLiter(s) IV Continuous <Continuous>  sodium chloride 0.9% Bolus 500 milliLiter(s) IV Bolus once    TOPICAL:    IMMUNOLOGIC & OTHER  nicotine  Polacrilex Gum 2 milliGRAM(s) Oral every 2 hours PRN        Allergies    sulfa drugs (Other)  fish (Angioedema)  shellfish (Angioedema)  benzodiazepines (Other)    Intolerances    Demerol HCl (Vomiting)  ampicillin (Vomiting)      SOCIAL HISTORY:  current smoker - 1 cig/day + vaping. drinks etoh 2-4 times/month  FAMILY HISTORY:  Family history of breast cancer (Sibling)    Family history of brain cancer (Sibling)        Vital Signs Last 24 Hrs  T(C): 36.9 (30 Apr 2025 16:35), Max: 36.9 (30 Apr 2025 16:35)  T(F): 98.4 (30 Apr 2025 16:35), Max: 98.4 (30 Apr 2025 16:35)  HR: 84 (30 Apr 2025 16:35) (74 - 86)  BP: 116/63 (30 Apr 2025 16:35) (105/64 - 133/86)  BP(mean): --  RR: 20 (30 Apr 2025 16:35) (12 - 24)  SpO2: 100% (30 Apr 2025 16:35) (99% - 100%)    Parameters below as of 30 Apr 2025 16:35  Patient On (Oxygen Delivery Method): room air          I&O's Detail    30 Apr 2025 07:01  -  30 Apr 2025 18:16  --------------------------------------------------------  IN:    Lactated Ringers: 1225 mL    Oral Fluid: 240 mL    Sodium Chloride 0.9% Bolus: 500 mL  Total IN: 1965 mL    OUT:    Blood Loss (mL): 50 mL  Total OUT: 50 mL    Total NET: 1915 mL        Daily Height in cm: 165.1 (30 Apr 2025 10:16)    Daily     REVIEW OF SYSTEMS:  as per hpi, other systems reviewed and are negative    PHYSICAL EXAM:    GENERAL: NAD, well-groomed, older female  HEAD:  Atraumatic, Normocephalic  EYES: EOMI, PERRLA, conjunctiva and sclera clear  ENMT: No tonsillar erythema, exudates, or enlargement; Moist mucous membranes, No lesions  NECK: Supple, No JVD, Normal thyroid  NERVOUS SYSTEM:  Alert & Oriented X3, Good concentration; CN grossly intact  CHEST/LUNG: Clear to auscultation bilaterally; No rales, rhonchi, wheezing, or rubs  HEART: Regular rate and rhythm; No murmurs, rubs, or gallops  ABDOMEN: Soft, Nontender, Nondistended; Bowel sounds present  EXTREMITIES:  2+ Peripheral Pulses, right knee wrapped in ace bandage  LYMPH: No lymphadenopathy   SKIN: No rashes or lesions      LABS:    Reviewed presurgical H+P, presurgical labs                   RADIOLOGY & ADDITIONAL STUDIES:

## 2025-04-30 NOTE — PHYSICAL THERAPY INITIAL EVALUATION ADULT - PERTINENT HX OF CURRENT PROBLEM, REHAB EVAL
Pt is a 70 y/o female with right knee primary OA. Pt is s/p right total knee replacement on 4/30/25.

## 2025-04-30 NOTE — DISCHARGE NOTE PROVIDER - NSDCMRMEDTOKEN_GEN_ALL_CORE_FT
Claritin 10 mg oral tablet: 1 tab(s) orally once a day  Levothroid 125 mcg (0.125 mg) oral tablet: 1 tab(s) orally every other day  levothyroxine 137 mcg (0.137 mg) oral tablet: 1 tab(s) orally once a day  mupirocin 2% topical ointment: 1 application in each nostril 2 times a day  Ozempic 2 mg/1.5 mL (0.25 mg or 0.5 mg dose) subcutaneous solution: 0.5 milligram(s) subcutaneously every 7 days Mondays   acetaminophen 500 mg oral tablet: 2 tab(s) orally every 8 hours  apixaban 2.5 mg oral tablet: 1 tab(s) orally every 12 hours start ecotrin every 12 hr once this course is compteted  aspirin 81 mg oral delayed release tablet: 1 tab(s) orally every 12 hours start after eliquis course is completed.  take 2 hrs before meloxicam  cefadroxil 500 mg oral capsule: 1 cap(s) orally 2 times a day  Claritin 10 mg oral tablet: 1 tab(s) orally once a day  Levothroid 125 mcg (0.125 mg) oral tablet: 1 tab(s) orally every other day  levothyroxine 137 mcg (0.137 mg) oral tablet: 1 tab(s) orally every other day  meloxicam 15 mg oral tablet: 1 tab(s) orally once a day take 2 hrs after ecotrin  omeprazole 20 mg oral delayed release capsule: 1 cap(s) orally once a day before breakfast  oxyCODONE 5 mg oral tablet: 1 tab(s) orally every 4 hours as needed for  moderate pain , may take up to two tabs for severe pain MDD: 6  Ozempic 2 mg/1.5 mL (0.25 mg or 0.5 mg dose) subcutaneous solution: 0.5 milligram(s) subcutaneously every 7 days Mondays  polyethylene glycol 3350 oral powder for reconstitution: 17 gram(s) orally once a day (at bedtime)  senna leaf extract oral tablet: 2 tab(s) orally once a day (at bedtime)   acetaminophen 500 mg oral tablet: 2 tab(s) orally every 8 hours  apixaban 2.5 mg oral tablet: 1 tab(s) orally every 12 hours start ecotrin every 12 hr once this course is compteted  aspirin 81 mg oral delayed release tablet: 1 tab(s) orally every 12 hours start after eliquis course is completed.  take 2 hrs before meloxicam  cefadroxil 500 mg oral capsule: 1 cap(s) orally 2 times a day  Claritin 10 mg oral tablet: 1 tab(s) orally once a day  Levothroid 125 mcg (0.125 mg) oral tablet: 1 tab(s) orally every other day  levothyroxine 137 mcg (0.137 mg) oral tablet: 1 tab(s) orally every other day  meloxicam 15 mg oral tablet: 1 tab(s) orally once a day take 2 hrs after ecotrin  Nicorette 2 mg oral transmucosal gum: 1 gum by transmucosal administration every 2 hours as needed for  cravings  omeprazole 20 mg oral delayed release capsule: 1 cap(s) orally once a day before breakfast  oxyCODONE 5 mg oral tablet: 1 tab(s) orally every 4 hours as needed for  moderate pain , may take up to two tabs for severe pain MDD: 6  Ozempic 2 mg/1.5 mL (0.25 mg or 0.5 mg dose) subcutaneous solution: 0.5 milligram(s) subcutaneously every 7 days Mondays  polyethylene glycol 3350 oral powder for reconstitution: 17 gram(s) orally once a day (at bedtime)  senna leaf extract oral tablet: 2 tab(s) orally once a day (at bedtime)

## 2025-04-30 NOTE — PHYSICAL THERAPY INITIAL EVALUATION ADULT - ADDITIONAL COMMENTS
Pt lives alone in a private house, there are 3 stairs +HR to enter and no stairs to negotiate within. Pt has a walk in shower. PTA pt was independent with ADLs and ambulation. Pt owns a RW and commode. Pt reports her daughter will assist upon return home.

## 2025-04-30 NOTE — DISCHARGE NOTE PROVIDER - INSTRUCTIONS
For Constipation :   • Increase your water intake. Drink at least 8 glasses of water daily.  • Try adding fiber to your diet by eating fruits, vegetables and foods that are rich in grains.  • If you do experience constipation, you may take an over-the-counter stool softener/laxative such as Lizet Colace, Senekot, miralax or  Milk of Magnesia.

## 2025-04-30 NOTE — DISCHARGE NOTE PROVIDER - NSDCFUSCHEDAPPT_GEN_ALL_CORE_FT
Allison Anderson  [facility]  ORTHOSURG [address] [place] [address]  Scheduled Appointment: 05/16/2025    Valentine Coronado  [facility]  ORTHOSUR[address]  Scheduled Appointment: 06/24/2025

## 2025-04-30 NOTE — DISCHARGE NOTE PROVIDER - CARE PROVIDER_API CALL
Valentine Coronado  Orthopaedic Surgery  [address], [address]  [place] [address]  Phone: [phone]  Fax: [phone]  Established Patient  Scheduled Appointment: 05/16/2025 11:00 AM

## 2025-04-30 NOTE — DISCHARGE NOTE PROVIDER - HOSPITAL COURSE
The patient is a 69  y.o. female with severe osteoarthritis of the right knee.  She was evaluated by the PST dept at [facility] and obtained the appropriate medical clearances.  After admission on 4/30/25 and receiving pre-operative parenteral prophylactic antibiotics (ancef), the patient  underwent an  uncomplicated right TKA by orthopedic surgeon Dr. Valentine Coronado.    A medical consultation from the Hospitalist service was obtained for post-operative medical co-management. Typical Physical & occupational therapy modalities post TKA were performed including ambulation training, range of motion, ADL's, and transfers. Eliquis 2.5 mg every 12 hrs, along w/ bilat venodynes, was given for DVT prophylaxis (caprini 11).  The patient had a clean appearing surgical incision with no sign of surgical site infections and had a stable neuro / vascular exam of the operated extremity.  After progression of mobility guided by the PT/ OT staff,  the patient was felt to benefit from further rehabilitative care for restoration to level of function. This was felt to best be accomplished at  home w/ home PT/rehab.  Discharge and Orthopedic Care instructions were delineated in the Discharge Plan and reviewed with the patient. All medications were delineated in the medication reconciliation tool and key points were reviewed with the patient. They were deemed stable from an Orthopedic & medical standpoint for discharge today.  Upon completion of Home PT she will be  following up with Dr. Coronado for office  follow up Orthopedic care.

## 2025-05-01 ENCOUNTER — TRANSCRIPTION ENCOUNTER (OUTPATIENT)
Age: 70
End: 2025-05-01

## 2025-05-01 VITALS
HEART RATE: 68 BPM | RESPIRATION RATE: 18 BRPM | DIASTOLIC BLOOD PRESSURE: 74 MMHG | OXYGEN SATURATION: 100 % | TEMPERATURE: 98 F | SYSTOLIC BLOOD PRESSURE: 135 MMHG

## 2025-05-01 LAB
ANION GAP SERPL CALC-SCNC: 8 MMOL/L — SIGNIFICANT CHANGE UP (ref 5–17)
BUN SERPL-MCNC: 16 MG/DL — SIGNIFICANT CHANGE UP (ref 7–23)
CALCIUM SERPL-MCNC: 9.2 MG/DL — SIGNIFICANT CHANGE UP (ref 8.4–10.5)
CHLORIDE SERPL-SCNC: 107 MMOL/L — SIGNIFICANT CHANGE UP (ref 96–108)
CO2 SERPL-SCNC: 27 MMOL/L — SIGNIFICANT CHANGE UP (ref 22–31)
CREAT SERPL-MCNC: 0.75 MG/DL — SIGNIFICANT CHANGE UP (ref 0.5–1.3)
EGFR: 86 ML/MIN/1.73M2 — SIGNIFICANT CHANGE UP
EGFR: 86 ML/MIN/1.73M2 — SIGNIFICANT CHANGE UP
GLUCOSE SERPL-MCNC: 88 MG/DL — SIGNIFICANT CHANGE UP (ref 70–99)
HCT VFR BLD CALC: 31.7 % — LOW (ref 34.5–45)
HGB BLD-MCNC: 10.8 G/DL — LOW (ref 11.5–15.5)
MCHC RBC-ENTMCNC: 30.4 PG — SIGNIFICANT CHANGE UP (ref 27–34)
MCHC RBC-ENTMCNC: 34.1 G/DL — SIGNIFICANT CHANGE UP (ref 32–36)
MCV RBC AUTO: 89.3 FL — SIGNIFICANT CHANGE UP (ref 80–100)
NRBC BLD AUTO-RTO: 0 /100 WBCS — SIGNIFICANT CHANGE UP (ref 0–0)
PLATELET # BLD AUTO: 259 K/UL — SIGNIFICANT CHANGE UP (ref 150–400)
POTASSIUM SERPL-MCNC: 4.4 MMOL/L — SIGNIFICANT CHANGE UP (ref 3.5–5.3)
POTASSIUM SERPL-SCNC: 4.4 MMOL/L — SIGNIFICANT CHANGE UP (ref 3.5–5.3)
RBC # BLD: 3.55 M/UL — LOW (ref 3.8–5.2)
RBC # FLD: 11.9 % — SIGNIFICANT CHANGE UP (ref 10.3–14.5)
SODIUM SERPL-SCNC: 142 MMOL/L — SIGNIFICANT CHANGE UP (ref 135–145)
WBC # BLD: 12.7 K/UL — HIGH (ref 3.8–10.5)
WBC # FLD AUTO: 12.7 K/UL — HIGH (ref 3.8–10.5)

## 2025-05-01 PROCEDURE — 99232 SBSQ HOSP IP/OBS MODERATE 35: CPT

## 2025-05-01 PROCEDURE — 99406 BEHAV CHNG SMOKING 3-10 MIN: CPT

## 2025-05-01 RX ORDER — OXYCODONE HYDROCHLORIDE 30 MG/1
1 TABLET ORAL
Qty: 42 | Refills: 0
Start: 2025-05-01 | End: 2025-05-07

## 2025-05-01 RX ORDER — NICOTINE POLACRILEX 4 MG/1
1 GUM, CHEWING ORAL
Qty: 3 | Refills: 2
Start: 2025-05-01 | End: 2025-07-29

## 2025-05-01 RX ADMIN — OXYCODONE HYDROCHLORIDE 10 MILLIGRAM(S): 30 TABLET ORAL at 07:07

## 2025-05-01 RX ADMIN — Medication 400 MILLIGRAM(S): at 01:27

## 2025-05-01 RX ADMIN — Medication 125 MICROGRAM(S): at 06:16

## 2025-05-01 RX ADMIN — OXYCODONE HYDROCHLORIDE 10 MILLIGRAM(S): 30 TABLET ORAL at 07:48

## 2025-05-01 RX ADMIN — MELOXICAM 15 MILLIGRAM(S): 15 TABLET ORAL at 09:09

## 2025-05-01 RX ADMIN — Medication 1000 MILLIGRAM(S): at 08:39

## 2025-05-01 RX ADMIN — OXYCODONE HYDROCHLORIDE 10 MILLIGRAM(S): 30 TABLET ORAL at 13:27

## 2025-05-01 RX ADMIN — MELOXICAM 15 MILLIGRAM(S): 15 TABLET ORAL at 08:39

## 2025-05-01 RX ADMIN — Medication 1000 MILLIGRAM(S): at 09:09

## 2025-05-01 RX ADMIN — Medication 100 MILLIGRAM(S): at 04:29

## 2025-05-01 RX ADMIN — Medication 40 MILLIGRAM(S): at 06:17

## 2025-05-01 RX ADMIN — Medication 1000 MILLIGRAM(S): at 01:38

## 2025-05-01 RX ADMIN — SODIUM CHLORIDE 125 MILLILITER(S): 9 INJECTION, SOLUTION INTRAVENOUS at 01:27

## 2025-05-01 RX ADMIN — APIXABAN 2.5 MILLIGRAM(S): 2.5 TABLET, FILM COATED ORAL at 08:39

## 2025-05-01 RX ADMIN — OXYCODONE HYDROCHLORIDE 10 MILLIGRAM(S): 30 TABLET ORAL at 12:57

## 2025-05-01 RX ADMIN — DEXAMETHASONE 101.6 MILLIGRAM(S): 0.5 TABLET ORAL at 06:16

## 2025-05-01 NOTE — PROGRESS NOTE ADULT - SUBJECTIVE AND OBJECTIVE BOX
Discharge medication calendar:  Eliquis 2.5mg q12h x 2 weeks then ASA EC 81mg q12h x 2 weeks  Omeprazole 20mg QAM x 4 weeks  Meloxicam 15mg QAM x 2-3 weeks  APAP 1000mg q8h x 2-3 weeks  Narcotic PRN  Docusate 100mg TID while taking narcotic  Miralax, Senna, or Bisacodyl PRN for treatment of constipation  Cefadroxil 500 mg q12h x 7 days

## 2025-05-01 NOTE — PROGRESS NOTE ADULT - SUBJECTIVE AND OBJECTIVE BOX
SUBJECTIVE: Patient seen and examined. No nausea/vomiting, nor shortness of breath    OBJECTIVE:     Vital Signs Last 24 Hrs  T(C): 36.7 (01 May 2025 03:20), Max: 36.9 (30 Apr 2025 16:35)  T(F): 98 (01 May 2025 03:20), Max: 98.4 (30 Apr 2025 16:35)  HR: 76 (01 May 2025 03:20) (74 - 86)  BP: 112/67 (01 May 2025 03:20) (105/64 - 133/86)  BP(mean): --  RR: 18 (01 May 2025 03:20) (12 - 24)  SpO2: 96% (01 May 2025 03:20) (96% - 100%)    Parameters below as of 01 May 2025 03:20  Patient On (Oxygen Delivery Method): room air        PAIN SCORE:         SCALE USED: (1-10 VNRS)        Affected extremity:          Dressing: clean/dry/intact            Sensation:           Motor exam:          5 / 5 Finger extension/Finger flexion/Hand          5/ 5 Tibialis Anterior/Gastrocnemius-Soleus , EHL         warm well perfused; capillary refill <3 seconds     LABS:            CAPILLARY BLOOD GLUCOSE          MEDICATIONS:    Anticoagulation:  apixaban 2.5 milliGRAM(s) Oral every 12 hours      Antibiotics:   cefadroxil 500 milliGRAM(s) Oral two times a day      Pain medications:   acetaminophen     Tablet .. 1000 milliGRAM(s) Oral every 8 hours  HYDROmorphone  Injectable 0.5 milliGRAM(s) IV Push every 3 hours PRN  meloxicam 15 milliGRAM(s) Oral daily  ondansetron Injectable 4 milliGRAM(s) IV Push every 6 hours PRN  oxyCODONE    IR 5 milliGRAM(s) Oral every 3 hours PRN  oxyCODONE    IR 10 milliGRAM(s) Oral every 3 hours PRN      A/P :  s/p Right [ ] Left [ ]   POD #   -    Pain control  -    DVT ppx: ASA   -    Check AM labs  -    Weight bearing status: WBAT   -    Physical Therapy  -    Dispo: Home  SUBJECTIVE: Patient seen and examined. No nausea/vomiting, nor shortness of breath. Progressing well with PT.     OBJECTIVE:     Vital Signs Last 24 Hrs  T(C): 36.7 (01 May 2025 03:20), Max: 36.9 (30 Apr 2025 16:35)  T(F): 98 (01 May 2025 03:20), Max: 98.4 (30 Apr 2025 16:35)  HR: 76 (01 May 2025 03:20) (74 - 86)  BP: 112/67 (01 May 2025 03:20) (105/64 - 133/86)  BP(mean): --  RR: 18 (01 May 2025 03:20) (12 - 24)  SpO2: 96% (01 May 2025 03:20) (96% - 100%)    Parameters below as of 01 May 2025 03:20  Patient On (Oxygen Delivery Method): room air        PAIN SCORE:     2    SCALE USED: (1-10 VNRS)        Affected extremity:          Dressing: clean/dry/intact  post operative dressing of GAYATRI dressing and green light battery functioning well          Sensation:  intact to light touch          Motor exam:          5/ 5 Tibialis Anterior/Gastrocnemius-Soleus , EHL         warm well perfused; capillary refill <3 seconds     LABS: pending      MEDICATIONS:    Anticoagulation:  apixaban 2.5 milliGRAM(s) Oral every 12 hours      Antibiotics:   cefadroxil 500 milliGRAM(s) Oral two times a day      Pain medications:   acetaminophen     Tablet .. 1000 milliGRAM(s) Oral every 8 hours  HYDROmorphone  Injectable 0.5 milliGRAM(s) IV Push every 3 hours PRN  meloxicam 15 milliGRAM(s) Oral daily  ondansetron Injectable 4 milliGRAM(s) IV Push every 6 hours PRN  oxyCODONE    IR 5 milliGRAM(s) Oral every 3 hours PRN  oxyCODONE    IR 10 milliGRAM(s) Oral every 3 hours PRN      A/P :  s/p Right TKR   POD # 1  -    Pain control  -    DVT ppx: Eliquis  -    Check AM labs  -    Weight bearing status: WBAT   -    Physical Therapy  -    Dispo: Home if cleared with PT

## 2025-05-01 NOTE — DISCHARGE NOTE NURSING/CASE MANAGEMENT/SOCIAL WORK - NSSCNAMETXT_GEN_ALL_CORE
[facility] care agency [phone] will reach out to you within 24-72 hours of your discharge to schedule home care visit/eval appointment with you. Please call agency for any queries regarding home care services

## 2025-05-01 NOTE — DISCHARGE NOTE NURSING/CASE MANAGEMENT/SOCIAL WORK - PATIENT PORTAL LINK FT
You can access the FollowMyHealth Patient Portal offered by [facility] by registering at the following website: [email or web address]. By joining [employer]’s FollowMyHealth portal, you will also be able to view your health information using other applications (apps) compatible with our system.

## 2025-05-01 NOTE — DISCHARGE NOTE NURSING/CASE MANAGEMENT/SOCIAL WORK - NSTOBACCONEVERSMOKERY/N_GEN_A
Hold candesartan two days before surgery     please call me when in hospital    Ask dr Emmanuel Desouza about trulicity or victoza    Nice to meet you Rekha   I think you will be fine!
Yes

## 2025-05-01 NOTE — DISCHARGE NOTE NURSING/CASE MANAGEMENT/SOCIAL WORK - FINANCIAL ASSISTANCE
[facility] provides services at a reduced cost to those who are determined to be eligible through [facility]’s financial assistance program. Information regarding [facility]’s financial assistance program can be found by going to [email or web address] or by calling [phone].

## 2025-05-01 NOTE — CARE COORDINATION ASSESSMENT. - NSCAREPROVIDERS_GEN_ALL_CORE_FT
CARE PROVIDERS:  Admitting: Valentine Coronado  Attending: Valentine Coronado  Consultant: Daniel Live  Covering Team: Renzo Elizondo  Covering Team: Markos Valdivia  Infection Control: Karla Black  Nurse: Chante Forte  Nurse: Danielle Berry  Nurse: oMira Zaragoza  Nurse: Lizzie Georges  Occupational Therapy: Sulaiman Garza  PCA/Nursing Assistant: Haile Howe  PCA/Nursing Assistant: Emily Delgado  Physical Therapy: Keri Sebastian  Primary Team: Rae Clayton  Primary Team: Anirudh Cantu  Primary Team: Myla Louis  Primary Team: Katerina Rod  Primary Team: William Godinez  Team: [facility], Team  UR// Supp. Assoc.: Alysia Gonzalez

## 2025-05-01 NOTE — CARE COORDINATION ASSESSMENT. - NSDCPLANSERVICES_GEN_ALL_CORE
CM met with patient at bedside.  Patient. A&O x 4. Pt s/p  PROCEDURES: Total right knee replacement.   Pt  resting comfortably / Pt has no complaints at this time.  CM explained role of CM and availability to assist with discharge planning throughout stay.   Provided CM contact information and pt. verbalized understanding. Pt lives alone in a private house, there are 3 stairs +HR to enter and no stairs to negotiate within. Pt has a walk in shower. PTA pt was independent with ADLs and ambulation. Pt owns a RW and commode. Pt reports her daughter will assist upon return home. Patient identified her daughter as her caregiver at home who will assist her during her recuperation and will transport her home and to MD appointments.   Pt. is agreeable with PT/OT's recommendations for d/c plan to home with HC/PT.  CM explained home care expectations, process, insurance provisions and home safety with good understanding.   Offered list of [facility] and pt. chose [facility] at home care agency.  Provided discharge resources folder. CM made referral accordingly.  Informed them of Anticipated  DC date for today 5/1/2025 and for SOC tomorrow 5/2/2025.   Pt verbalizing understanding and in agreement with d/c plans.  DME: Pt has a RW, commode at home.  PCP: Dr morris Mayberry [phone]    Pt stated she will be receiving meds to bed from [facility] pharmacy prior to DC./Home Care

## 2025-05-01 NOTE — PROGRESS NOTE ADULT - ASSESSMENT
68 yo female, pmh of dvt, hypothyroidism, JALEEL/obesity now on ozempic, presenting for right knee replacement.     right knee replacement  - pain control - standing tylenol 1000 q8h,  - meloxicam 15 mg daily  oxycodone 5/10 prn for mod/severe pain   - bowel regimen   - eliquis 2.5 mg BID for DVT ppx  - no medical contraindication to DC pending PT clearance    hypothyroidism - continue synthroid at home dosing (125/137 mcg each every other day alternating)    jaleel - can resume ozempic on dc    current smoker - will send prescription for nicorette gum to take at home, spent 10 minutes discussing smoking cessation outside of regular duties, patient is willing to try to quit

## 2025-05-01 NOTE — PATIENT CHOICE NOTE. - NSPTCHOICESTATE_GEN_ALL_CORE

## 2025-05-01 NOTE — CARE COORDINATION ASSESSMENT. - NSPASTMEDSURGHISTORY_GEN_ALL_CORE_FT
PAST MEDICAL & SURGICAL HISTORY:  Vapes nicotine containing substance      Snoring      At risk for sleep apnea      Seasonal allergies      Hypothyroidism      History of hepatitis A      Osteoarthritis of right knee      Personal history of TIA (transient ischemic attack)      History of DVT of lower extremity      Fallopian tube disorder      History of appendectomy

## 2025-05-01 NOTE — PATIENT CHOICE NOTE. - NSPOSTACUTEPROV_GEN_ALL_CORE
Home Care Received Approval from [employer] for Dexcom G6 Transmitters on 5/16; approval granted from 5/15/23 - 5/15/24. Sent to HIM for scanning.

## 2025-05-01 NOTE — PROGRESS NOTE ADULT - SUBJECTIVE AND OBJECTIVE BOX
Patient is a 69y old  Female who presents with a chief complaint of s/p right TKR (01 May 2025 07:43)      INTERVAL HPI/OVERNIGHT EVENTS:  Patient seen and examined in am, having some pain in knee, worse after PT Denies dizziness fever, chills, nausea, vomiting.     ROS reviewed and is otherwise negative        Vital Signs Last 24 Hrs  T(C): 36.4 (01 May 2025 08:37), Max: 36.9 (30 Apr 2025 16:35)  T(F): 97.6 (01 May 2025 08:37), Max: 98.4 (30 Apr 2025 16:35)  HR: 68 (01 May 2025 08:37) (68 - 86)  BP: 135/74 (01 May 2025 08:37) (105/64 - 135/74)  BP(mean): --  RR: 18 (01 May 2025 08:37) (12 - 24)  SpO2: 100% (01 May 2025 08:37) (96% - 100%)    Parameters below as of 01 May 2025 03:20  Patient On (Oxygen Delivery Method): room air        PHYSICAL EXAM:  GENERAL: NAD, well-groomed   HEAD:  Atraumatic, Normocephalic  EYES: EOMI, PERRLA  ENMT: Moist mucous membranes, No lesions; No tonsillar erythema  NECK: Supple, No JVD   NERVOUS SYSTEM:  Alert & Oriented X3, Good concentration; All 4 extremities mobile   CHEST/LUNG: Clear to auscultation bilaterally; No rales, rhonchi, wheezing, or rubs  HEART: Regular rate and rhythm; No murmurs, rubs, or gallops  ABDOMEN: Soft, Nontender, Nondistended; Bowel sounds present  EXTREMITIES:  + Pulses, right knee site c/d/i  SKIN: No rashes or lesions    MEDICATIONS  (STANDING):  acetaminophen     Tablet .. 1000 milliGRAM(s) Oral every 8 hours  apixaban 2.5 milliGRAM(s) Oral every 12 hours  cefadroxil 500 milliGRAM(s) Oral two times a day  lactated ringers. 1000 milliLiter(s) (125 mL/Hr) IV Continuous <Continuous>  levothyroxine 125 MICROGram(s) Oral <User Schedule>  meloxicam 15 milliGRAM(s) Oral daily  pantoprazole    Tablet 40 milliGRAM(s) Oral before breakfast  polyethylene glycol 3350 17 Gram(s) Oral at bedtime  senna 2 Tablet(s) Oral at bedtime    MEDICATIONS  (PRN):  aluminum hydroxide/magnesium hydroxide/simethicone Suspension 30 milliLiter(s) Oral four times a day PRN Indigestion  cetirizine 10 milliGRAM(s) Oral daily PRN allergy  HYDROmorphone  Injectable 0.5 milliGRAM(s) IV Push every 3 hours PRN Breakthrough pain  magnesium hydroxide Suspension 30 milliLiter(s) Oral daily PRN Constipation  nicotine  Polacrilex Gum 2 milliGRAM(s) Oral every 2 hours PRN Smoking Cessation  ondansetron Injectable 4 milliGRAM(s) IV Push every 6 hours PRN Nausea and/or Vomiting  oxyCODONE    IR 5 milliGRAM(s) Oral every 3 hours PRN Moderate Pain (4 - 6)  oxyCODONE    IR 10 milliGRAM(s) Oral every 3 hours PRN Severe Pain (7 - 10)      Allergies    sulfa drugs (Other)  fish (Angioedema)  shellfish (Angioedema)  benzodiazepines (Other)    Intolerances    Demerol HCl (Vomiting)  ampicillin (Vomiting)        LABS:                        10.8   12.70 )-----------( 259      ( 01 May 2025 08:07 )             31.7     01 May 2025 08:07    142    |  107    |  16     ----------------------------<  88     4.4     |  27     |  0.75     Ca    9.2        01 May 2025 08:07        Urinalysis Basic - ( 01 May 2025 08:07 )    Color: x / Appearance: x / SG: x / pH: x  Gluc: 88 mg/dL / Ketone: x  / Bili: x / Urobili: x   Blood: x / Protein: x / Nitrite: x   Leuk Esterase: x / RBC: x / WBC x   Sq Epi: x / Non Sq Epi: x / Bacteria: x      CAPILLARY BLOOD GLUCOSE          RADIOLOGY & ADDITIONAL TESTS:        Care Discussed with Consultants/Other Providers:    Advanced Directives: [ ] DNR  [ ] No feeding tube  [ ] MOLST in chart  [ ] MOLST completed today  [ ] Unknown

## 2025-05-01 NOTE — OCCUPATIONAL THERAPY INITIAL EVALUATION ADULT - ADDITIONAL COMMENTS
Pt lives alone in a private house, there are 3 stairs +HR to enter and no stairs to negotiate within. Pt has a walk in shower. . Pt owns a RW and commode. Pt reports her daughter will assist upon return home.

## 2025-05-07 ENCOUNTER — NON-APPOINTMENT (OUTPATIENT)
Age: 70
End: 2025-05-07

## 2025-05-09 DIAGNOSIS — Z96.651 PRESENCE OF RIGHT ARTIFICIAL KNEE JOINT: ICD-10-CM

## 2025-05-09 RX ORDER — OXYCODONE 5 MG/1
5 TABLET ORAL
Qty: 30 | Refills: 0 | Status: ACTIVE | COMMUNITY
Start: 2025-05-09 | End: 1900-01-01

## 2025-05-14 ENCOUNTER — NON-APPOINTMENT (OUTPATIENT)
Age: 70
End: 2025-05-14

## 2025-05-16 ENCOUNTER — APPOINTMENT (OUTPATIENT)
Dept: ORTHOPEDIC SURGERY | Facility: CLINIC | Age: 70
End: 2025-05-16
Payer: MEDICARE

## 2025-05-16 VITALS
HEIGHT: 65 IN | DIASTOLIC BLOOD PRESSURE: 75 MMHG | SYSTOLIC BLOOD PRESSURE: 130 MMHG | TEMPERATURE: 96.9 F | HEART RATE: 73 BPM | WEIGHT: 138 LBS | BODY MASS INDEX: 22.99 KG/M2

## 2025-05-16 DIAGNOSIS — Z96.651 PRESENCE OF RIGHT ARTIFICIAL KNEE JOINT: ICD-10-CM

## 2025-05-16 PROCEDURE — 99024 POSTOP FOLLOW-UP VISIT: CPT

## 2025-05-16 PROCEDURE — 73562 X-RAY EXAM OF KNEE 3: CPT | Mod: RT

## 2025-05-16 RX ORDER — GABAPENTIN 100 MG/1
100 CAPSULE ORAL
Qty: 10 | Refills: 0 | Status: ACTIVE | COMMUNITY
Start: 2025-05-16 | End: 1900-01-01

## 2025-05-16 RX ORDER — OXYCODONE 5 MG/1
5 TABLET ORAL EVERY 4 HOURS
Qty: 40 | Refills: 0 | Status: ACTIVE | COMMUNITY
Start: 2025-05-16 | End: 1900-01-01

## 2025-05-19 RX ORDER — MELOXICAM 15 MG/1
15 TABLET ORAL
Refills: 0 | Status: ACTIVE | COMMUNITY

## 2025-05-19 RX ORDER — ASPIRIN 81 MG
81 TABLET, DELAYED RELEASE (ENTERIC COATED) ORAL
Refills: 0 | Status: ACTIVE | COMMUNITY

## 2025-06-02 PROCEDURE — C1713: CPT

## 2025-06-02 PROCEDURE — C1889: CPT

## 2025-06-02 PROCEDURE — 97161 PT EVAL LOW COMPLEX 20 MIN: CPT

## 2025-06-02 PROCEDURE — 97110 THERAPEUTIC EXERCISES: CPT

## 2025-06-02 PROCEDURE — C1776: CPT

## 2025-06-02 PROCEDURE — 73560 X-RAY EXAM OF KNEE 1 OR 2: CPT

## 2025-06-02 PROCEDURE — 80048 BASIC METABOLIC PNL TOTAL CA: CPT

## 2025-06-02 PROCEDURE — 97165 OT EVAL LOW COMPLEX 30 MIN: CPT

## 2025-06-02 PROCEDURE — 27447 TOTAL KNEE ARTHROPLASTY: CPT

## 2025-06-02 PROCEDURE — 85027 COMPLETE CBC AUTOMATED: CPT

## 2025-06-02 PROCEDURE — 36415 COLL VENOUS BLD VENIPUNCTURE: CPT

## 2025-06-02 PROCEDURE — 97530 THERAPEUTIC ACTIVITIES: CPT

## 2025-06-02 PROCEDURE — 94664 DEMO&/EVAL PT USE INHALER: CPT

## 2025-06-02 PROCEDURE — 97116 GAIT TRAINING THERAPY: CPT

## 2025-06-04 ENCOUNTER — NON-APPOINTMENT (OUTPATIENT)
Age: 70
End: 2025-06-04

## 2025-06-05 ENCOUNTER — OFFICE (OUTPATIENT)
Facility: LOCATION | Age: 70
Setting detail: OPHTHALMOLOGY
End: 2025-06-05
Payer: MEDICARE

## 2025-06-05 DIAGNOSIS — H01.001: ICD-10-CM

## 2025-06-05 DIAGNOSIS — H43.813: ICD-10-CM

## 2025-06-05 DIAGNOSIS — H16.223: ICD-10-CM

## 2025-06-05 DIAGNOSIS — H25.13: ICD-10-CM

## 2025-06-05 DIAGNOSIS — H01.004: ICD-10-CM

## 2025-06-05 PROCEDURE — 99213 OFFICE O/P EST LOW 20 MIN: CPT | Performed by: OPHTHALMOLOGY

## 2025-06-05 ASSESSMENT — REFRACTION_CURRENTRX
OS_CYLINDER: -1.50
OS_VPRISM_DIRECTION: SV
OS_OVR_VA: 20/
OS_CYLINDER: -0.50
OD_CYLINDER: -0.50
OD_SPHERE: +1.25
OS_SPHERE: +3.25
OD_SPHERE: +2.50
OS_AXIS: 090
OD_CYLINDER: -0.50
OS_AXIS: 092
OD_AXIS: 055
OS_OVR_VA: 20/
OD_AXIS: 060
OD_OVR_VA: 20/
OD_VPRISM_DIRECTION: SV
OS_SPHERE: +4.50
OD_OVR_VA: 20/

## 2025-06-05 ASSESSMENT — REFRACTION_AUTOREFRACTION
OD_CYLINDER: -0.75
OD_SPHERE: +2.75
OD_AXIS: 042
OS_SPHERE: +3.25
OS_AXIS: 075
OS_CYLINDER: -0.50

## 2025-06-05 ASSESSMENT — VISUAL ACUITY
OS_BCVA: 20/30-2
OD_BCVA: 20/50-2

## 2025-06-05 ASSESSMENT — CONFRONTATIONAL VISUAL FIELD TEST (CVF)
OD_FINDINGS: FULL
OS_FINDINGS: FULL

## 2025-06-05 ASSESSMENT — REFRACTION_MANIFEST
OD_SPHERE: +1.25
OD_ADD: +2.25
OS_ADD: +2.25
OS_AXIS: 090
OS_SPHERE: +3.25
OD_CYLINDER: -0.50
OD_AXIS: 060
OS_CYLINDER: -1.50

## 2025-06-05 ASSESSMENT — KERATOMETRY
OD_K2POWER_DIOPTERS: 50.25
OS_AXISANGLE_DEGREES: 069
OD_AXISANGLE_DEGREES: 116
OD_K1POWER_DIOPTERS: 49.25
OS_K1POWER_DIOPTERS: 44.50
OS_K2POWER_DIOPTERS: 44.75

## 2025-06-05 ASSESSMENT — LID EXAM ASSESSMENTS
OD_EDEMA: RUL 1+
OD_BLEPHARITIS: RUL T
OS_BLEPHARITIS: LUL T

## 2025-06-05 ASSESSMENT — SUPERFICIAL PUNCTATE KERATITIS (SPK)
OD_SPK: 1+
OS_SPK: 1+

## 2025-06-24 ENCOUNTER — APPOINTMENT (OUTPATIENT)
Dept: ORTHOPEDIC SURGERY | Facility: CLINIC | Age: 70
End: 2025-06-24
Payer: MEDICARE

## 2025-06-24 PROCEDURE — 99024 POSTOP FOLLOW-UP VISIT: CPT

## 2025-06-24 PROCEDURE — 73562 X-RAY EXAM OF KNEE 3: CPT | Mod: RT

## 2025-06-24 RX ORDER — CELECOXIB 200 MG/1
200 CAPSULE ORAL
Qty: 60 | Refills: 1 | Status: ACTIVE | COMMUNITY
Start: 2025-06-24 | End: 1900-01-01

## 2025-06-24 RX ORDER — TRAMADOL HYDROCHLORIDE 50 MG/1
50 TABLET, COATED ORAL EVERY 6 HOURS
Qty: 20 | Refills: 0 | Status: ACTIVE | COMMUNITY
Start: 2025-06-24 | End: 1900-01-01

## 2025-07-14 ENCOUNTER — NON-APPOINTMENT (OUTPATIENT)
Age: 70
End: 2025-07-14

## (undated) DEVICE — PACK TOTAL KNEE

## (undated) DEVICE — WARMING BLANKET UPPER ADULT

## (undated) DEVICE — SOL IRR POUR H2O 1500ML

## (undated) DEVICE — SOLIDIFIER CANN EXPRESS 3K

## (undated) DEVICE — SYR LUER LOK 50CC

## (undated) DEVICE — ELCTR AQUAMANTYS BIPOLAR SEALER 6.0

## (undated) DEVICE — TOURNIQUET CUFF 34" DUAL PORT W PLC

## (undated) DEVICE — WOUND IRR IRRISEPT W 0.5 CHG

## (undated) DEVICE — SUT VICRYL PLUS 1 27" CP UNDYED

## (undated) DEVICE — SUT VICRYL PLUS 2-0 27" CP-1 UNDYED

## (undated) DEVICE — ORTHOALIGN PLUS UNIT

## (undated) DEVICE — HOOD FLYTE STRYKER HELMET SHIELD

## (undated) DEVICE — SOL IRR POUR NS 0.9% 500ML

## (undated) DEVICE — NDL SPINAL 18G X 3.5" (PINK)

## (undated) DEVICE — SOL IRR BAG NS 0.9% 3000ML

## (undated) DEVICE — STRYKER PULSE LAVAGE WITH COAXIAL MULTI-ORIFICE TIP

## (undated) DEVICE — ELCTR ROCKER SWITCH PENCIL BLUE 10FT

## (undated) DEVICE — DRSG PICO NPWT 4X12"

## (undated) DEVICE — SUT STRATAFIX SPIRAL MONOCRYL PLUS 3-0 30CM PS-2 UNDYED

## (undated) DEVICE — DRSG DERMABOND PRINEO 60CM

## (undated) DEVICE — VENODYNE/SCD SLEEVE CALF MEDIUM

## (undated) DEVICE — SPECIMEN CONTAINER PET